# Patient Record
Sex: MALE | Race: WHITE | Employment: OTHER | URBAN - METROPOLITAN AREA
[De-identification: names, ages, dates, MRNs, and addresses within clinical notes are randomized per-mention and may not be internally consistent; named-entity substitution may affect disease eponyms.]

---

## 2023-07-03 RX ORDER — GABAPENTIN 600 MG/1
600 TABLET ORAL 3 TIMES DAILY
COMMUNITY

## 2023-07-03 RX ORDER — LANOLIN ALCOHOL/MO/W.PET/CERES
1000 CREAM (GRAM) TOPICAL DAILY
COMMUNITY

## 2023-07-03 RX ORDER — M-VIT,TX,IRON,MINS/CALC/FOLIC 27MG-0.4MG
1 TABLET ORAL DAILY
COMMUNITY

## 2023-07-03 RX ORDER — FAMOTIDINE 40 MG/1
40 TABLET, FILM COATED ORAL 2 TIMES DAILY
COMMUNITY

## 2023-07-03 RX ORDER — DONEPEZIL HYDROCHLORIDE 10 MG/1
10 TABLET, FILM COATED ORAL NIGHTLY
COMMUNITY

## 2023-07-03 RX ORDER — EZETIMIBE 10 MG/1
10 TABLET ORAL DAILY
COMMUNITY

## 2023-07-03 RX ORDER — BISOPROLOL FUMARATE 10 MG/1
15 TABLET, FILM COATED ORAL NIGHTLY
COMMUNITY

## 2023-07-03 RX ORDER — ISOSORBIDE MONONITRATE 30 MG/1
30 TABLET, EXTENDED RELEASE ORAL DAILY
COMMUNITY

## 2023-07-03 RX ORDER — PREDNISOLONE ACETATE 10 MG/ML
1 SUSPENSION/ DROPS OPHTHALMIC 2 TIMES DAILY
COMMUNITY

## 2023-07-03 RX ORDER — SERTRALINE HYDROCHLORIDE 25 MG/1
25 TABLET, FILM COATED ORAL DAILY
COMMUNITY

## 2023-07-03 RX ORDER — ATORVASTATIN CALCIUM 80 MG/1
80 TABLET, FILM COATED ORAL DAILY
COMMUNITY

## 2023-07-03 RX ORDER — KETOROLAC TROMETHAMINE 5 MG/ML
1 SOLUTION OPHTHALMIC 4 TIMES DAILY
COMMUNITY

## 2023-07-03 RX ORDER — OFLOXACIN 3 MG/ML
3 SOLUTION/ DROPS OPHTHALMIC 4 TIMES DAILY
COMMUNITY

## 2023-07-03 RX ORDER — LEVETIRACETAM 750 MG/1
750 TABLET ORAL 2 TIMES DAILY
COMMUNITY

## 2023-07-14 ENCOUNTER — ANESTHESIA EVENT (OUTPATIENT)
Dept: ENDOSCOPY | Age: 73
End: 2023-07-14
Payer: MEDICARE

## 2023-07-17 ENCOUNTER — ANESTHESIA (OUTPATIENT)
Dept: ENDOSCOPY | Age: 73
End: 2023-07-17
Payer: MEDICARE

## 2023-07-17 ENCOUNTER — HOSPITAL ENCOUNTER (OUTPATIENT)
Age: 73
Setting detail: OUTPATIENT SURGERY
Discharge: HOME | End: 2023-07-17
Attending: INTERNAL MEDICINE | Admitting: INTERNAL MEDICINE
Payer: MEDICARE

## 2023-07-17 VITALS
HEART RATE: 50 BPM | RESPIRATION RATE: 18 BRPM | OXYGEN SATURATION: 95 % | DIASTOLIC BLOOD PRESSURE: 81 MMHG | WEIGHT: 248.02 LBS | BODY MASS INDEX: 30.84 KG/M2 | TEMPERATURE: 97.2 F | SYSTOLIC BLOOD PRESSURE: 114 MMHG | HEIGHT: 75 IN

## 2023-07-17 DIAGNOSIS — Z12.11 COLON CANCER SCREENING: ICD-10-CM

## 2023-07-17 PROCEDURE — 2709999900 HC NON-CHARGEABLE SUPPLY: Performed by: INTERNAL MEDICINE

## 2023-07-17 PROCEDURE — 2580000003 HC RX 258: Performed by: STUDENT IN AN ORGANIZED HEALTH CARE EDUCATION/TRAINING PROGRAM

## 2023-07-17 PROCEDURE — 2500000003 HC RX 250 WO HCPCS

## 2023-07-17 PROCEDURE — 6360000002 HC RX W HCPCS

## 2023-07-17 PROCEDURE — 6360000002 HC RX W HCPCS: Performed by: STUDENT IN AN ORGANIZED HEALTH CARE EDUCATION/TRAINING PROGRAM

## 2023-07-17 PROCEDURE — 7100000000 HC PACU RECOVERY - FIRST 15 MIN: Performed by: INTERNAL MEDICINE

## 2023-07-17 PROCEDURE — 2580000003 HC RX 258: Performed by: ANESTHESIOLOGY

## 2023-07-17 PROCEDURE — 3700000000 HC ANESTHESIA ATTENDED CARE: Performed by: INTERNAL MEDICINE

## 2023-07-17 PROCEDURE — 3700000001 HC ADD 15 MINUTES (ANESTHESIA): Performed by: INTERNAL MEDICINE

## 2023-07-17 PROCEDURE — 7100000010 HC PHASE II RECOVERY - FIRST 15 MIN: Performed by: INTERNAL MEDICINE

## 2023-07-17 PROCEDURE — 88305 TISSUE EXAM BY PATHOLOGIST: CPT

## 2023-07-17 PROCEDURE — 2500000003 HC RX 250 WO HCPCS: Performed by: STUDENT IN AN ORGANIZED HEALTH CARE EDUCATION/TRAINING PROGRAM

## 2023-07-17 PROCEDURE — 3609010600 HC COLONOSCOPY POLYPECTOMY SNARE/COLD BIOPSY: Performed by: INTERNAL MEDICINE

## 2023-07-17 PROCEDURE — 7100000011 HC PHASE II RECOVERY - ADDTL 15 MIN: Performed by: INTERNAL MEDICINE

## 2023-07-17 RX ORDER — SODIUM CHLORIDE 0.9 % (FLUSH) 0.9 %
5-40 SYRINGE (ML) INJECTION PRN
Status: DISCONTINUED | OUTPATIENT
Start: 2023-07-17 | End: 2023-07-17 | Stop reason: HOSPADM

## 2023-07-17 RX ORDER — ONDANSETRON 2 MG/ML
4 INJECTION INTRAMUSCULAR; INTRAVENOUS
Status: DISCONTINUED | OUTPATIENT
Start: 2023-07-17 | End: 2023-07-17 | Stop reason: HOSPADM

## 2023-07-17 RX ORDER — IPRATROPIUM BROMIDE AND ALBUTEROL SULFATE 2.5; .5 MG/3ML; MG/3ML
1 SOLUTION RESPIRATORY (INHALATION)
Status: DISCONTINUED | OUTPATIENT
Start: 2023-07-17 | End: 2023-07-17 | Stop reason: HOSPADM

## 2023-07-17 RX ORDER — SODIUM CHLORIDE 9 MG/ML
INJECTION, SOLUTION INTRAVENOUS PRN
Status: DISCONTINUED | OUTPATIENT
Start: 2023-07-17 | End: 2023-07-17 | Stop reason: HOSPADM

## 2023-07-17 RX ORDER — SODIUM CHLORIDE 0.9 % (FLUSH) 0.9 %
5-40 SYRINGE (ML) INJECTION EVERY 12 HOURS SCHEDULED
Status: DISCONTINUED | OUTPATIENT
Start: 2023-07-17 | End: 2023-07-17 | Stop reason: HOSPADM

## 2023-07-17 RX ORDER — LIDOCAINE HYDROCHLORIDE 20 MG/ML
INJECTION, SOLUTION EPIDURAL; INFILTRATION; INTRACAUDAL; PERINEURAL PRN
Status: DISCONTINUED | OUTPATIENT
Start: 2023-07-17 | End: 2023-07-17 | Stop reason: SDUPTHER

## 2023-07-17 RX ORDER — PROPOFOL 10 MG/ML
INJECTION, EMULSION INTRAVENOUS CONTINUOUS PRN
Status: DISCONTINUED | OUTPATIENT
Start: 2023-07-17 | End: 2023-07-17 | Stop reason: SDUPTHER

## 2023-07-17 RX ORDER — PROPOFOL 10 MG/ML
INJECTION, EMULSION INTRAVENOUS PRN
Status: DISCONTINUED | OUTPATIENT
Start: 2023-07-17 | End: 2023-07-17 | Stop reason: SDUPTHER

## 2023-07-17 RX ORDER — ONDANSETRON 2 MG/ML
4 INJECTION INTRAMUSCULAR; INTRAVENOUS ONCE
Status: COMPLETED | OUTPATIENT
Start: 2023-07-17 | End: 2023-07-17

## 2023-07-17 RX ADMIN — PROPOFOL 180 MCG/KG/MIN: 10 INJECTION, EMULSION INTRAVENOUS at 09:07

## 2023-07-17 RX ADMIN — LIDOCAINE HYDROCHLORIDE 100 MG: 20 INJECTION, SOLUTION EPIDURAL; INFILTRATION; INTRACAUDAL; PERINEURAL at 09:07

## 2023-07-17 RX ADMIN — SODIUM CHLORIDE: 9 INJECTION, SOLUTION INTRAVENOUS at 09:04

## 2023-07-17 RX ADMIN — ONDANSETRON 4 MG: 2 INJECTION INTRAMUSCULAR; INTRAVENOUS at 07:39

## 2023-07-17 RX ADMIN — Medication 20 MG: at 07:38

## 2023-07-17 RX ADMIN — PROPOFOL 100 MG: 10 INJECTION, EMULSION INTRAVENOUS at 09:07

## 2023-07-17 ASSESSMENT — PAIN DESCRIPTION - DESCRIPTORS: DESCRIPTORS: ACHING

## 2023-07-17 ASSESSMENT — PAIN - FUNCTIONAL ASSESSMENT: PAIN_FUNCTIONAL_ASSESSMENT: 0-10

## 2023-07-17 NOTE — ANESTHESIA POSTPROCEDURE EVALUATION
Department of Anesthesiology  Postprocedure Note    Patient: Griffin Schultz  MRN: [id number]  YOB: 1950  Date of evaluation: 7/17/2023      Procedure Summary     Date: 07/17/23 Room / Location: [address] [place] [address]    Anesthesia Start: 2212 Anesthesia Stop: 9370    Procedure: COLONOSCOPY POLYPECTOMY SNARE/COLD BIOPSY Diagnosis:       Colon cancer screening      (COLON CANCER SCREENING)    Surgeons: Surya Coronado MD Responsible Provider: Rashida Russell MD    Anesthesia Type: MAC ASA Status: 3          Anesthesia Type: MAC    Hoda Phase I: Hoda Score: 10    Hoda Phase II: Hoda Score: 9      Anesthesia Post Evaluation    Patient location during evaluation: PACU  Patient participation: complete - patient participated  Level of consciousness: awake  Airway patency: patent  Nausea & Vomiting: no nausea and no vomiting  Complications: no  Cardiovascular status: hemodynamically stable and blood pressure returned to baseline  Respiratory status: spontaneous ventilation and nonlabored ventilation  Hydration status: stable  Comments: Mr. Pamela Villagomez was seen resting comfortably following procedure. Appropriate for return to [facility] for planned discharge home with . No acute concerns.

## 2023-07-17 NOTE — ANESTHESIA PRE PROCEDURE
Department of Anesthesiology  Preprocedure Note       Name:  Sophia Mclaughlin   Age:  67 y.o.  :  1950                                          MRN:  [id number]         Date:  2023      Surgeon: Sukhjinder Quispe):  Sher Thomas MD    Procedure: Procedure(s):  COLONOSCOPY    Medications prior to admission:   Prior to Admission medications    Medication Sig Start Date End Date Taking? Authorizing Provider   apixaban (ELIQUIS) 5 MG TABS tablet Take by mouth 2 times daily   Yes Historical Provider, MD   levETIRAcetam (KEPPRA) 750 MG tablet Take 1 tablet by mouth 2 times daily   Yes Historical Provider, MD   bisoprolol (ZEBETA) 10 MG tablet Take 1.5 tablets by mouth at bedtime   Yes Historical Provider, MD   ezetimibe (ZETIA) 10 MG tablet Take 1 tablet by mouth daily   Yes Historical Provider, MD   isosorbide mononitrate (IMDUR) 30 MG extended release tablet Take 1 tablet by mouth daily   Yes Historical Provider, MD   gabapentin (NEURONTIN) 600 MG tablet Take 1 tablet by mouth 3 times daily.    Yes Historical Provider, MD   atorvastatin (LIPITOR) 80 MG tablet Take 1 tablet by mouth daily   Yes Historical Provider, MD   donepezil (ARICEPT) 10 MG tablet Take 1 tablet by mouth nightly   Yes Historical Provider, MD   famotidine (PEPCID) 40 MG tablet Take 1 tablet by mouth 2 times daily   Yes Historical Provider, MD   sertraline (ZOLOFT) 25 MG tablet Take 1 tablet by mouth daily   Yes Historical Provider, MD   prednisoLONE acetate (PRED FORTE) 1 % ophthalmic suspension Place 1 drop into the right eye in the morning and at bedtime   Yes Historical Provider, MD   ketorolac (ACULAR) 0.5 % ophthalmic solution Place 1 drop into the right eye 4 times daily   Yes Historical Provider, MD   ofloxacin (OCUFLOX) 0.3 % solution Place 3 drops into the left eye 4 times daily   Yes Historical Provider, MD   Multiple Vitamins-Minerals (THERAPEUTIC MULTIVITAMIN-MINERALS) tablet Take 1 tablet by mouth daily   Yes Historical

## 2023-07-17 NOTE — DISCHARGE INSTRUCTIONS
Recommendations:  Await pathology results   Recommend resuming Eliquis in 24 hours. Recommend repeat colonoscopy in 3 years for surveillance purposes if remains in good health. Results will be posted to the [facility] patient portal in 7-10 days. If you dont have access call the office phone at [phone] for results. Discharge Instructions for Colonoscopy     Colonoscopy is a visual exam of the lining of the large intestine, also called the bowel or colon, with a colonoscope. A colonoscope is a flexible tube with a light and a viewing device. It allows the doctor to view the inside of the colon through a tiny video camera. Colonoscopy is performed for many reasons: unexplained anemia , pain, diarrhea , bloody stools, cancer screening, among many other reasons. Complications from a colonoscopy are rare. Some possible serious complications include perforated bowel (which might require surgery) and bleeding (which could require blood transfusion ). Minor complications include bloating, gas, and cramping that can last for 1-2 days after the procedure. Because air is put into your colon during the procedure, it is normal to pass large amounts of air from your rectum. You may not have a bowel movement for 1-3 days after the procedure. What You Will Need:  Someone to drive you home after the procedure     Steps to Take:  [address] when you get home. Because the sedative will make you drowsy, don't drive, operate machinery, or make important decisions the day of the procedure. Feelings of bloating, gas, or cramping may persist for 24 hours. Diet -  Try sips of water first. If tolerated, resume bland food (scrambled eggs, toast, soup) first.  If tolerated, resume regular diet or the diet recommended by your physician. Do not drink alcohol for 24 hours. Physical Activity -  Ask your doctor when you will be able to return to work.    Do not drive, operate heavy machinery, or do

## (undated) DEVICE — SNARES COLD OVAL 10MM THIN

## (undated) DEVICE — ENDOSCOPY KIT: Brand: MEDLINE INDUSTRIES, INC.